# Patient Record
Sex: FEMALE | Race: WHITE | NOT HISPANIC OR LATINO | URBAN - METROPOLITAN AREA
[De-identification: names, ages, dates, MRNs, and addresses within clinical notes are randomized per-mention and may not be internally consistent; named-entity substitution may affect disease eponyms.]

---

## 2024-01-31 ENCOUNTER — APPOINTMENT (RX ONLY)
Dept: URBAN - METROPOLITAN AREA CLINIC 308 | Facility: CLINIC | Age: 54
Setting detail: DERMATOLOGY
End: 2024-01-31

## 2024-01-31 DIAGNOSIS — L72.0 EPIDERMAL CYST: ICD-10-CM

## 2024-01-31 PROCEDURE — 99202 OFFICE O/P NEW SF 15 MIN: CPT

## 2024-01-31 PROCEDURE — ? COUNSELING

## 2024-01-31 ASSESSMENT — LOCATION ZONE DERM: LOCATION ZONE: ARM

## 2024-01-31 ASSESSMENT — LOCATION DETAILED DESCRIPTION DERM: LOCATION DETAILED: LEFT ANTERIOR LATERAL PROXIMAL UPPER ARM

## 2024-01-31 ASSESSMENT — LOCATION SIMPLE DESCRIPTION DERM: LOCATION SIMPLE: LEFT UPPER ARM

## 2024-01-31 NOTE — PROCEDURE: COUNSELING
Detail Level: Detailed
Patient Specific Counseling (Will Not Stick From Patient To Patient): REASSURED PT THAT HX AND PE ARE C/W RUPTURED EPIDERMOID CYST.  THERE CONTINUES TO BE FLUCTUANCE.  DID OFFER I&D TO FULLY DRAIN LESION.  PT DECLINES.  ALSO OFFERED IL TAC FOR ASSOCIATED INFLAMMATION.  PT DECLINES AND WILL COMPLETE COURSE OF BACTRIM AND CONTINUE TO APPLY HOT COMPRESSES AT NIGHT.  \\n\\nTHOROUGH DISCUSSION ON EPIDERMOID CYSTS AND THE IMPORTANCE OF NOT SQUEEZING THESE LESIONS.  IF IT RECURS SHE MAY F/U WITH SURGEON FOR EXCISION.  INFORMATION PROVIDED.  ALL QUESTIONS ADDRESSED.

## 2024-01-31 NOTE — HPI: EVALUATION OF SKIN LESION(S)
What Type Of Note Output Would You Prefer (Optional)?: Bullet Format
Hpi Title: Evaluation of Skin Lesions
Have Your Spot(S) Been Treated In The Past?: has not been treated
Additional History: Pt states she has a bump on her left arm. It has been there for about 2 years. It has recently got bigger since last Thursday . It has been leaking white /blood pus. Pt went to her PCP and gave her an antibiotic sulfamethoxazole BID by mouth since last Thursday.